# Patient Record
Sex: FEMALE | Race: OTHER | Employment: FULL TIME | URBAN - METROPOLITAN AREA
[De-identification: names, ages, dates, MRNs, and addresses within clinical notes are randomized per-mention and may not be internally consistent; named-entity substitution may affect disease eponyms.]

---

## 2018-03-06 ENCOUNTER — HOSPITAL ENCOUNTER (EMERGENCY)
Age: 23
Discharge: HOME | End: 2018-03-06
Attending: EMERGENCY MEDICINE
Payer: COMMERCIAL

## 2018-03-06 VITALS
OXYGEN SATURATION: 97 % | HEIGHT: 61 IN | RESPIRATION RATE: 20 BRPM | WEIGHT: 219 LBS | BODY MASS INDEX: 41.35 KG/M2 | DIASTOLIC BLOOD PRESSURE: 87 MMHG | HEART RATE: 106 BPM | TEMPERATURE: 97.7 F | SYSTOLIC BLOOD PRESSURE: 113 MMHG

## 2018-03-06 DIAGNOSIS — B02.9 HERPES ZOSTER WITHOUT COMPLICATION: Primary | ICD-10-CM

## 2018-03-06 PROCEDURE — 99282 EMERGENCY DEPT VISIT SF MDM: CPT

## 2018-03-06 RX ORDER — ACYCLOVIR 800 MG/1
800 TABLET ORAL
Qty: 50 TAB | Refills: 0 | Status: SHIPPED | OUTPATIENT
Start: 2018-03-06 | End: 2018-03-16

## 2018-03-06 RX ORDER — OXYCODONE AND ACETAMINOPHEN 5; 325 MG/1; MG/1
1 TABLET ORAL
Qty: 20 TAB | Refills: 0 | Status: SHIPPED | OUTPATIENT
Start: 2018-03-06

## 2018-03-06 NOTE — LETTER
[facility] [place] 
[facility] 
[address] [address] 
[phone] Work/School Note Date: 3/6/2018 To Whom It May concern: 
 
Derrick Briggs was seen and treated today in the emergency room by the following provider(s): 
Attending Provider: Nataly Patiño MD 
Physician Assistant: Caesar Ribeiro PA-C. Derrick Briggs may return to work on 3/9/18. Sincerely, Evelyn Radford PA-C

## 2018-03-06 NOTE — ED PROVIDER NOTES
EMERGENCY DEPARTMENT HISTORY AND PHYSICAL EXAM    Date: 3/6/2018  Patient Name: Priya Wilhelm    History of Presenting Illness     Chief Complaint   Patient presents with    Rash         History Provided By: Patient    Chief Complaint: Burning rash with blisters  Duration: 1 Weeks  Timing:  Acute  Location: Right-sided face radiating down her neck and into her back  Quality: Burning  Severity: 8 out of 10  Modifying Factors: No alleviation with a steroid cream.   Associated Symptoms: denies any other associated signs or symptoms    Additional History (Context):   9:21 AM  Priya Wilhelm is a 21 y.o. female with no significant PMHx who presents to the emergency department C/O burning rash (8/10) with blisters to right side of face radiating down her neck and back onset 1 weeks ago. No associated sxs. Pt reports she was seen at [facility] on 2/27/18 and was dx'ed with acute torticollis. States she was seen at [facility] after care on 3/3 for same and was placed on a steroid cream as patient recently used a new hair dye, which has provided no alleviation. NKDA. Pt is currently on her menstrual cycle. Pt denies fever, chills, recent illness, and any other sxs or complaints. PCP: No primary care provider on file. Past History     Past Medical History:  No past medical history on file. Past Surgical History:  No past surgical history on file. Family History:  No family history on file. Social History:  Social History   Substance Use Topics    Smoking status: Not on file    Smokeless tobacco: Not on file    Alcohol use Not on file       Allergies:  No Known Allergies      Review of Systems   Review of Systems   Constitutional: Negative for chills and fever. HENT: Positive for facial swelling (right side of face). Negative for congestion. Eyes: Negative for pain, redness and visual disturbance. Musculoskeletal: Positive for neck pain (at area of rash). Skin: Positive for rash. Neurological: Negative for dizziness, weakness and headaches. Hematological: Negative for adenopathy. Physical Exam     Vitals:    03/06/18 0914   BP: 113/87   Pulse: (!) 106   Resp: 20   Temp: 97.7 °F (36.5 °C)   SpO2: 97%   Weight: 99.3 kg (219 lb)   Height: 5' 1\" (1.549 m)     Physical Exam   Constitutional: She is oriented to person, place, and time. She appears well-developed and well-nourished. No distress.  female in NAD. Alert. Tearful at times   HENT:   Head: Normocephalic and atraumatic. Right Ear: There is swelling (R helix with rash) and tenderness. Tympanic membrane is not perforated, not erythematous and not bulging. Left Ear: External ear normal. No swelling or tenderness. Tympanic membrane is not perforated, not erythematous and not bulging. Nose: Nose normal. No mucosal edema or rhinorrhea. Right sinus exhibits no maxillary sinus tenderness and no frontal sinus tenderness. Left sinus exhibits no maxillary sinus tenderness and no frontal sinus tenderness. Mouth/Throat: Uvula is midline, oropharynx is clear and moist and mucous membranes are normal. No oral lesions. No trismus in the jaw. No dental abscesses or uvula swelling. No oropharyngeal exudate, posterior oropharyngeal edema, posterior oropharyngeal erythema or tonsillar abscesses. Eyes: Conjunctivae and EOM are normal. Pupils are equal, round, and reactive to light. Right eye exhibits no discharge. Left eye exhibits no discharge. Neck: Normal range of motion. Cardiovascular: Normal rate, regular rhythm, normal heart sounds and intact distal pulses. Exam reveals no gallop and no friction rub. No murmur heard. Pulmonary/Chest: Effort normal and breath sounds normal. No accessory muscle usage. No tachypnea. No respiratory distress. She has no decreased breath sounds. She has no wheezes. She has no rhonchi. She has no rales. Musculoskeletal: Normal range of motion.    Neurological: She is alert and oriented to person, place, and time. Skin: Skin is warm. She is not diaphoretic. Psychiatric: She has a normal mood and affect. Judgment normal.   tearful   Nursing note and vitals reviewed. Diagnostic Study Results     Labs -   No results found for this or any previous visit (from the past 12 hour(s)). Radiologic Studies -   No orders to display     CT Results  (Last 48 hours)    None        CXR Results  (Last 48 hours)    None          Medications given in the ED-  Medications - No data to display      Medical Decision Making   I am the first provider for this patient. I reviewed the vital signs, available nursing notes, past medical history, past surgical history, family history and social history. Vital Signs-Reviewed the patient's vital signs. Pulse Oximetry Analysis - 97% on RA. Records Reviewed: Nursing Notes    Provider Notes (Medical Decision Making): zoster, dermatitis, tinea, eczema, viral exanthem. Procedures:  Procedures    ED Course:   9:21 AM Initial assessment performed. The patients presenting problems have been discussed, and they are in agreement with the care plan formulated and outlined with them. I have encouraged them to ask questions as they arise throughout their visit. Diagnosis and Disposition     C/W zoster along C2, C3, C4. No evidence of nasociliary involvement. Antivirals. Pain meds. Stop topical steroid. FU with PCP. Reasons to RTED discussed with pt. All questions answered. Pt feels comfortable going home at this time. Pt expressed understanding and she agrees with plan. DISCHARGE NOTE:  9:31 AM  Radha Maya  results have been reviewed with her. She has been counseled regarding her diagnosis, treatment, and plan. She verbally conveys understanding and agreement of the signs, symptoms, diagnosis, treatment and prognosis and additionally agrees to follow up as discussed.   She also agrees with the care-plan and conveys that all of her questions have been answered. I have also provided discharge instructions for her that include: educational information regarding their diagnosis and treatment, and list of reasons why they would want to return to the ED prior to their follow-up appointment, should her condition change. She has been provided with education for proper emergency department utilization. CLINICAL IMPRESSION:    1. Herpes zoster without complication        PLAN:  1. D/C Home  2. Current Discharge Medication List      START taking these medications    Details   acyclovir (ZOVIRAX) 800 mg tablet Take 1 Tab by mouth five (5) times daily for 10 days. Indications: herpes zoster  Qty: 50 Tab, Refills: 0    Associated Diagnoses: Herpes zoster without complication      oxyCODONE-acetaminophen (PERCOCET) 5-325 mg per tablet Take 1 Tab by mouth every four (4) hours as needed for Pain. Max Daily Amount: 6 Tabs. Qty: 20 Tab, Refills: 0    Associated Diagnoses: Herpes zoster without complication         STOP taking these medications       TRIAMCINOLONE, BULK, Comments:   Reason for Stopping:             3.   Follow-up Information     Follow up With Details Comments Contact [facility] Schedule an appointment as soon as possible for a visit in 3 days For primary care follow up. [address] [facility], [address] [facility] [address] [facility] [address]    [facility] EMERG[facility] DE[facility] Go to As needed, If symptoms worsen [address] [id number] [phone]        _______________________________    Attestations: This note is prepared by Oralia Diaz, acting as Scribe for Fritz Francisco PA-C. Fritz Francisco PA-C:  The scribe's documentation has been prepared under my direction and personally reviewed by me in its entirety.   I confirm that the note above accurately reflects all work, treatment, procedures, and medical decision making performed by me.  _______________________________

## 2018-03-06 NOTE — ED TRIAGE NOTES
C/o painful rash with blisters to right side of face that is spreading to back, face and around right ear since March 1. Pt seen at [facility] on Feb 27 dx'd with torticolis, seen by [facility] after care on feb 3 for rash.

## 2018-03-06 NOTE — DISCHARGE INSTRUCTIONS
Shingles: Care Instructions  Your Care Instructions    Shingles (herpes zoster) causes pain and a blistered rash. The rash can appear anywhere on the body but will be on only one side of the body, the left or right. It will be in a band, a strip, or a small area. The pain can be very severe. Shingles can also cause tingling or itching in the area of the rash. The blisters scab over after a few days and heal in 2 to 4 weeks. Medicines can help you feel better and may help prevent more serious problems caused by shingles. Shingles is caused by the same virus that causes chickenpox. When you have chickenpox, the virus gets into your nerve roots and stays there (becomes dormant) long after you get over the chickenpox. If the virus becomes active again, it can cause shingles. Follow-up care is a key part of your treatment and safety. Be sure to make and go to all appointments, and call your doctor if you are having problems. It's also a good idea to know your test results and keep a list of the medicines you take. How can you care for yourself at home? · Be safe with medicines. Take your medicines exactly as prescribed. Call your doctor if you think you are having a problem with your medicine. Antiviral medicine helps you get better faster. · Try not to scratch or pick at the blisters. They will crust over and fall off on their own if you leave them alone. · Put cool, wet cloths on the area to relieve pain and itching. You can also use calamine lotion. Try not to use so much lotion that it cakes and is hard to get off. · Put cornstarch or baking soda on the sores to help dry them out so they heal faster. · Do not use thick ointment, such as petroleum jelly, on the sores. This will keep them from drying and healing. · To help remove loose crusts, soak them in tap water. This can help decrease oozing, and dry and soothe the skin.   · Take an over-the-counter pain medicine, such as acetaminophen (Tylenol), ibuprofen (Advil, Motrin), or naproxen (Aleve). Read and follow all instructions on the label. · Avoid close contact with people until the blisters have healed. It is very important for you to avoid contact with anyone who has never had chickenpox or the chickenpox vaccine. Pregnant women, young babies, and anyone else who has a hard time fighting infection (such as someone with HIV, diabetes, or cancer) is especially at risk. When should you call for help? Call your doctor now or seek immediate medical care if:  ? · You have a new or higher fever. ? · You have a severe headache and a stiff neck. ? · You lose the ability to think clearly. ? · The rash spreads to your forehead, nose, eyes, or eyelids. ? · You have eye pain, or your vision gets worse. ? · You have new pain in your face, or you cannot move the muscles in your face. ? · Blisters spread to new parts of your body. ? Watch closely for changes in your health, and be sure to contact your doctor if:  ? · The rash has not healed after 2 to 4 weeks. ? · You still have pain after the rash has healed. Where can you learn more? Go to http://liss-simeon.info/. Rowena Buckner in the search box to learn more about \"Shingles: Care Instructions. \"  Current as of: March 3, 2017  Content Version: 11.4  © 8776-8964 [employer]. Care instructions adapted under license by [employer] (which disclaims liability or warranty for this information). If you have questions about a medical condition or this instruction, always ask your healthcare professional. [address] any warranty or liability for your use of this information.

## 2018-10-20 ENCOUNTER — HOSPITAL ENCOUNTER (EMERGENCY)
Age: 23
Discharge: HOME | End: 2018-10-20
Attending: EMERGENCY MEDICINE
Payer: OTHER MISCELLANEOUS

## 2018-10-20 VITALS
HEART RATE: 79 BPM | OXYGEN SATURATION: 100 % | BODY MASS INDEX: 40.02 KG/M2 | DIASTOLIC BLOOD PRESSURE: 69 MMHG | WEIGHT: 212 LBS | RESPIRATION RATE: 16 BRPM | HEIGHT: 61 IN | TEMPERATURE: 97.7 F | SYSTOLIC BLOOD PRESSURE: 117 MMHG

## 2018-10-20 DIAGNOSIS — Z23 NEED FOR TDAP VACCINATION: ICD-10-CM

## 2018-10-20 DIAGNOSIS — Z02.6 ENCOUNTER RELATED TO WORKER'S COMPENSATION CLAIM: ICD-10-CM

## 2018-10-20 DIAGNOSIS — S61.212A LACERATION OF RIGHT MIDDLE FINGER WITHOUT FOREIGN BODY WITHOUT DAMAGE TO NAIL, INITIAL ENCOUNTER: Primary | ICD-10-CM

## 2018-10-20 PROCEDURE — 75810000293 HC SIMP/SUPERF WND  RPR

## 2018-10-20 PROCEDURE — 74011250636 HC RX REV CODE- 250/636: Performed by: PHYSICIAN ASSISTANT

## 2018-10-20 PROCEDURE — 90471 IMMUNIZATION ADMIN: CPT

## 2018-10-20 PROCEDURE — 77030002916 HC SUT ETHLN J&J -A

## 2018-10-20 PROCEDURE — 74011000250 HC RX REV CODE- 250: Performed by: PHYSICIAN ASSISTANT

## 2018-10-20 PROCEDURE — 90715 TDAP VACCINE 7 YRS/> IM: CPT | Performed by: PHYSICIAN ASSISTANT

## 2018-10-20 PROCEDURE — 99283 EMERGENCY DEPT VISIT LOW MDM: CPT

## 2018-10-20 RX ORDER — BACITRACIN 500 [USP'U]/G
OINTMENT TOPICAL
Status: COMPLETED | OUTPATIENT
Start: 2018-10-20 | End: 2018-10-20

## 2018-10-20 RX ADMIN — TETANUS TOXOID, REDUCED DIPHTHERIA TOXOID AND ACELLULAR PERTUSSIS VACCINE, ADSORBED 0.5 ML: 5; 2.5; 8; 8; 2.5 SUSPENSION INTRAMUSCULAR at 02:01

## 2018-10-20 RX ADMIN — BACITRACIN: 500 OINTMENT TOPICAL at 02:00

## 2018-10-20 NOTE — ED NOTES
I have reviewed discharge instructions with the patient. The patient verbalized understanding. I have reviewed the provider's instructions with the patient, answering all questions to her satisfaction. Pt electronically signed d/c instructions pt removed all belongings and ambulated without distress or discomfort.

## 2018-10-20 NOTE — DISCHARGE INSTRUCTIONS
Cuts Closed With Stitches: Care Instructions  Your Care Instructions  A cut can happen anywhere on your body. The doctor used stitches to close the cut. Using stitches also helps the cut heal and reduces scarring. Sometimes pieces of tape called Steri-Strips are put over the stitches. If the cut went deep and through the skin, the doctor may have put in two layers of stitches. The deeper layer brings the deep part of the cut together. These stitches will dissolve and don't need to be removed. The stitches in the upper layer are the ones you see on the cut. You will probably have a bandage over the stitches. You will need to have the stitches removed, usually in 7 to 14 days. The doctor has checked you carefully, but problems can develop later. If you notice any problems or new symptoms, get medical treatment right away. Follow-up care is a key part of your treatment and safety. Be sure to make and go to all appointments, and call your doctor if you are having problems. It's also a good idea to know your test results and keep a list of the medicines you take. How can you care for yourself at home? · Keep the cut dry for the first 24 to 48 hours. After this, you can shower if your doctor okays it. Pat the cut dry. · Don't soak the cut, such as in a bathtub. Your doctor will tell you when it's safe to get the cut wet. · If your doctor told you how to care for your cut, follow your doctor's instructions. If you did not get instructions, follow this general advice:  ? After the first 24 to 48 hours, wash around the cut with clean water 2 times a day. Don't use hydrogen peroxide or alcohol, which can slow healing. ? You may cover the cut with a thin layer of petroleum jelly, such as Vaseline, and a nonstick bandage. ? Apply more petroleum jelly and replace the bandage as needed. · Prop up the sore area on a pillow anytime you sit or lie down during the next 3 days.  Try to keep it above the level of your heart. This will help reduce swelling. · Avoid any activity that could cause your cut to reopen. · Do not remove the stitches on your own. Your doctor will tell you when to come back to have the stitches removed. · Leave Steri-Strips on until they fall off. · Be safe with medicines. Read and follow all instructions on the label. ? If the doctor gave you a prescription medicine for pain, take it as prescribed. ? If you are not taking a prescription pain medicine, ask your doctor if you can take an over-the-counter medicine. When should you call for help? Call your doctor now or seek immediate medical care if:    · You have new pain, or your pain gets worse.     · The skin near the cut is cold or pale or changes color.     · You have tingling, weakness, or numbness near the cut.     · The cut starts to bleed, and blood soaks through the bandage. Oozing small amounts of blood is normal.     · You have trouble moving the area near the cut.     · You have symptoms of infection, such as:  ? Increased pain, swelling, warmth, or redness around the cut.  ? Red streaks leading from the cut.  ? Pus draining from the cut.  ? A fever.    Watch closely for changes in your health, and be sure to contact your doctor if:    · The cut reopens.     · You do not get better as expected. Where can you learn more? Go to http://liss-simeon.info/. Enter R217 in the search box to learn more about \"Cuts Closed With Stitches: Care Instructions. \"  Current as of: November 20, 2017  Content Version: 11.8  © 6077-7169 [employer]. Care instructions adapted under license by [employer] (which disclaims liability or warranty for this information). If you have questions about a medical condition or this instruction, always ask your healthcare professional. [address] any warranty or liability for your use of this information.

## 2018-10-20 NOTE — LETTER
[facility] [place] 
[facility] EMERGENCY DEPT 
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 Laceration of right middle finger without foreign body without damage to nail, initial encounter S61.212A 883.0 2. Need for Tdap vaccination Z23 V06.1 3. Encounter related to worker's compensation claim Z02.6 V70.3 Instructions:  Employee must return copy of this report to Personnel and/or Supervisor. Patient Identification - Company Protocol:   
 []  Checked & Followed [x]  Not Available PART I:  Check Treatment 
 []  X-ray   []  Lab  [x]  Discharge Instructions Given  []  Rx Given 
 []  Non-Prescription Meds  [x]  Sutures     []  EKG [x]  Other (Comment): Tdap Part II:  Disposition 
 [x]  May Return to Regular Work Without Restrictions []  May Return to Restricted Duty as Below Explanation of RESTRICTIONS (Comment):   
 
 []  May NOT Return to Work until cleared by Referral Specialist or Occupational Medicine MD 
 
Part III:  Follow-Up Follow-up Information Follow up With Specialties Details Why Contact Info [facility] OCCUPATIONAL MED Occupational Medicine Schedule an appointment as soon as possible for a visit For follow up with [address] [place] [address] [address] [place] [address] [address] 
[phone] [facility] EMERGENCY DEPT Emergency Medicine Go to  For suture removal in 5-7 days [address] [address] 
[phone] Part IV: Was Workers Comp Supervisor Notified  []   Yes  [x]   No 
 
[place] was seen in the Emergency Department on 10/20/2018 by the following provider(s): 
Attending Provider: Hardy Ghotra MD 
 Physician Assistant: Delfina Vale; ED Nurse: PLEASE CALL CASE FACILITATOR, OCCUPATIONAL MEDICINE  
[phone] TO SCHEDULE AN APPOINTMENT Referral Physicians: Katie Haider MD 
[address] [facility]. [address], [address] [address]   [address], [address] Phone:  [phone]; Fax:  [phone] [address] [address] [facility] [address] [place] [address]. [address]    [address], [address] 
[phone] [facility] [address] [facility], [address], [address] 
[phone]

## 2018-10-20 NOTE — ED TRIAGE NOTES
Pt presents to ED with laceration to RT middle finger. \"I sliced it with a tomato slicer at work\" at 8288. Pt holding tissue to site. Bleeding controled. Pt unsure if Tetanus is up to date.

## 2018-10-20 NOTE — ED PROVIDER NOTES
EMERGENCY DEPARTMENT HISTORY AND PHYSICAL EXAM 
 
Date: 10/20/2018 Patient Name: Petros Yee History of Presenting Illness Chief Complaint Patient presents with  Laceration History Provided By: Patient Chief Complaint: laceration Duration: 30 Minutes Timing:  Acute Location: right 3rd finger, distal aspect Associated Symptoms:  right 3rd finger pain Additional History (Context):  
1:20 AM 
Petros Yee is a 21 y.o. female who presents to the emergency department C/O right 3rd finger laceration onset ~30 minutes ago. Associated symptoms include right 3rd finger pain. Reports she was cleaning the tomato cutter at work and cut her right 3rd finger. Pt denies numbness, tingling, weakeness any other Sx or complaints. PCP: Razia, MD Jim 
 
Current Outpatient Medications Medication Sig Dispense Refill  oxyCODONE-acetaminophen (PERCOCET) 5-325 mg per tablet Take 1 Tab by mouth every four (4) hours as needed for Pain. Max Daily Amount: 6 Tabs. 20 Tab 0 Past History Past Medical History: 
History reviewed. No pertinent past medical history. Past Surgical History: 
Past Surgical History:  
Procedure Laterality Date  HX HEENT Family History: 
History reviewed. No pertinent family history. Social History: 
Social History Tobacco Use  Smoking status: Never Smoker  Smokeless tobacco: Never Used Substance Use Topics  Alcohol use: No  
  Frequency: Never  Drug use: No  
 
 
Allergies: 
No Known Allergies Review of Systems Review of Systems Musculoskeletal: Positive for arthralgias. Skin: Positive for wound. Neurological: Negative for dizziness, weakness and numbness. All other systems reviewed and are negative. Physical Exam  
 
Vitals:  
 10/20/18 0115 10/20/18 0222 BP: (!) 151/102 117/69 Pulse: 89 79 Resp: 14 16 Temp: 97.7 °F (36.5 °C) SpO2: 99% 100% Weight: 96.2 kg (212 lb) Height: 5' 1\" (1.549 m) Physical Exam  
Constitutional: She is oriented to person, place, and time. She appears well-developed and well-nourished. No distress.  female in NAD. Alert. Appears comfortable. HENT:  
Head: Normocephalic and atraumatic. Right Ear: External ear normal.  
Left Ear: External ear normal.  
Nose: Nose normal.  
Eyes: Conjunctivae are normal.  
Neck: Normal range of motion. Cardiovascular: Normal rate, regular rhythm, normal heart sounds and intact distal pulses. Exam reveals no gallop and no friction rub. No murmur heard. Pulses: 
     Radial pulses are 2+ on the right side, and 2+ on the left side. Pulmonary/Chest: Effort normal and breath sounds normal. No accessory muscle usage. No tachypnea. She has no decreased breath sounds. She has no rhonchi. Musculoskeletal: Normal range of motion. Right hand: She exhibits laceration. She exhibits normal range of motion, no tenderness, normal capillary refill and no swelling. Normal sensation noted. Normal strength noted. Hands: 
Neurological: She is alert and oriented to person, place, and time. Skin: Skin is warm and dry. She is not diaphoretic. Psychiatric: She has a normal mood and affect. Judgment normal.  
Nursing note and vitals reviewed. Diagnostic Study Results Labs - No results found for this or any previous visit (from the past 12 hour(s)). Radiologic Studies - No orders to display CT Results  (Last 48 hours) None CXR Results  (Last 48 hours) None Medications given in the ED- Medications diph,Pertuss(AC),Tet Vac-PF (BOOSTRIX) suspension 0.5 mL (0.5 mL IntraMUSCular Given 10/20/18 0201) bacitracin 500 unit/gram ointment ( Topical Given 10/20/18 0200) Medical Decision Making I am the first provider for this patient.  
 
I reviewed the vital signs, available nursing notes, past medical history, past surgical history, family history and social history. Vital Signs-Reviewed the patient's vital signs. Pulse Oximetry Analysis - 99% on RA Records Reviewed: Nursing Notes and Old Medical Records Provider Notes (Medical Decision Making): laceration to finger. No tendon involvement. NVI. Will perform suture repair. Procedures:  
Wound Repair 
Date/Time: 10/20/2018 1:24 AM 
Performed by: [id number] [facility] provider: Dr. Portillo Doing Preparation: sterile field established and skin prepped with Shur-Clens Time out: Immediately prior to the procedure a time out was called to verify the correct patient, procedure, equipment, staff and marking as appropriate. Ezra Counter Location details: right long finger Wound length:2.5 cm or less Anesthesia: local infiltration Anesthesia: 
Local Anesthetic: lidocaine 1% without epinephrine Anesthetic total: 1 mL Foreign bodies: no foreign bodies Irrigation solution: saline Irrigation method: syringe Skin closure: 5-0 nylon Number of sutures: 3 Technique: simple Approximation: close Dressing: 4x4 and antibiotic ointment Patient tolerance: Patient tolerated the procedure well with no immediate complications My total time at bedside, performing this procedure was 1-15 minutes. ED Course:  
1:20 AM Initial assessment performed. The patients presenting problems have been discussed, and they are in agreement with the care plan formulated and outlined with them. I have encouraged them to ask questions as they arise throughout their visit. Diagnosis and Disposition Successful suture repair. NVI. Removal in 5-7 days. Reviewed proper wound care. Tdap updated. Reasons to RTED discussed with pt. All questions answered. Pt feels comfortable going home at this time. Pt expressed understanding and she agrees with plan. DISCHARGE NOTE: 
1:45 AM 
Liset Jackson's  results have been reviewed with her.   She has been counseled regarding her diagnosis, treatment, and plan. She verbally conveys understanding and agreement of the signs, symptoms, diagnosis, treatment and prognosis and additionally agrees to follow up as discussed. She also agrees with the care-plan and conveys that all of her questions have been answered. I have also provided discharge instructions for her that include: educational information regarding their diagnosis and treatment, and list of reasons why they would want to return to the ED prior to their follow-up appointment, should her condition change. She has been provided with education for proper emergency department utilization. CLINICAL IMPRESSION: 
 
1. Laceration of right middle finger without foreign body without damage to nail, initial encounter 2. Need for Tdap vaccination 3. Encounter related to worker's compensation claim PLAN: 
1. D/C Home 2. Discharge Medication List as of 10/20/2018  1:45 AM  
  
 
3. Follow-up Information Follow up With Specialties Details Why Contact Info [facility] OCCUPATIONAL MED Occupational Medicine Schedule an appointment as soon as possible for a visit For follow up with [address] [place] [address] [address] [place] [id number] 
[phone] [facility] EMERGENCY DEPT Emergency Medicine Go to  For suture removal in 5-7 days [address] [place] [id number] 
[phone]  
  
 
_______________________________ Attestations: This note is prepared by Gina Dominguez, acting as Scribe for [employer]. [employer]:  The scribe's documentation has been prepared under my direction and personally reviewed by me in its entirety. I confirm that the note above accurately reflects all work, treatment, procedures, and medical decision making performed by me. 
_______________________________

## 2018-10-28 ENCOUNTER — HOSPITAL ENCOUNTER (EMERGENCY)
Age: 23
Discharge: HOME | End: 2018-10-28
Attending: EMERGENCY MEDICINE
Payer: COMMERCIAL

## 2018-10-28 VITALS
OXYGEN SATURATION: 98 % | WEIGHT: 215 LBS | BODY MASS INDEX: 40.59 KG/M2 | RESPIRATION RATE: 16 BRPM | HEIGHT: 61 IN | HEART RATE: 69 BPM | DIASTOLIC BLOOD PRESSURE: 73 MMHG | TEMPERATURE: 97.7 F | SYSTOLIC BLOOD PRESSURE: 123 MMHG

## 2018-10-28 DIAGNOSIS — Z48.02 VISIT FOR SUTURE REMOVAL: Primary | ICD-10-CM

## 2018-10-28 PROCEDURE — 75810000275 HC EMERGENCY DEPT VISIT NO LEVEL OF CARE

## 2018-10-28 PROCEDURE — 77030008027

## 2018-10-28 NOTE — DISCHARGE INSTRUCTIONS

## 2018-10-28 NOTE — ED PROVIDER NOTES
EMERGENCY DEPARTMENT HISTORY AND PHYSICAL EXAM    Date: 10/28/2018  Patient Name: Ephraim Youngblood    History of Presenting Illness     Chief Complaint   Patient presents with    Suture Removal         History Provided By: Patient    Chief Complaint: Suture removal  Duration: 8 Days  Timing:  Acute  Location: Right third finger  Modifying Factors: No relieving or worsening factors  Associated Symptoms: denies any other associated signs or symptoms    Additional History (Context):   1:06 PM  Ephraim Youngblood is a 21 y.o. female who presents to the emergency department due to suture removal for 3 intact sutures placed on the distal aspect right third finger at this facility 7 days ago. Pt denies any other sxs or complaints. PCP: Jim Randle MD    Current Outpatient Medications   Medication Sig Dispense Refill    oxyCODONE-acetaminophen (PERCOCET) 5-325 mg per tablet Take 1 Tab by mouth every four (4) hours as needed for Pain. Max Daily Amount: 6 Tabs. 20 Tab 0       Past History     Past Medical History:  History reviewed. No pertinent past medical history. Past Surgical History:  Past Surgical History:   Procedure Laterality Date    HX HEENT         Family History:  History reviewed. No pertinent family history. Social History:  Social History     Tobacco Use    Smoking status: Never Smoker    Smokeless tobacco: Never Used   Substance Use Topics    Alcohol use: No     Frequency: Never    Drug use: No       Allergies:  No Known Allergies      Review of Systems   Review of Systems   Constitutional: Negative for fever. Skin: Positive for wound (right third finger, healing laceration). All other systems reviewed and are negative. Physical Exam     Vitals:    10/28/18 1247   BP: 123/73   Pulse: 69   Resp: 16   Temp: 97.7 °F (36.5 °C)   SpO2: 98%   Weight: 97.5 kg (215 lb)   Height: 5' 1\" (1.549 m)     Physical Exam   Constitutional: She is oriented to person, place, and time.  She appears well-developed and well-nourished. HENT:   Head: Normocephalic. Eyes: Conjunctivae are normal.   Neck: Normal range of motion. Pulmonary/Chest: Effort normal.   Musculoskeletal: Normal range of motion. Hands:  Neurological: She is alert and oriented to person, place, and time. Skin: Skin is warm and dry. No erythema. Diagnostic Study Results     Labs -   No results found for this or any previous visit (from the past 12 hour(s)). Radiologic Studies -   No orders to display     CT Results  (Last 48 hours)    None        CXR Results  (Last 48 hours)    None          Medications given in the ED-  Medications - No data to display      Medical Decision Making   I am the first provider for this patient. I reviewed the vital signs, available nursing notes, past medical history, past surgical history, family history and social history. Vital Signs-Reviewed the patient's vital signs. Pulse Oximetry Analysis - 98% on room air       Records Reviewed: Nursing Notes and Old Medical Records    Provider Notes (Medical Decision Making): Sutures removed. Good wound healing. Patient understands reasons to return and will follow up with [facility]. Procedures:  Suture/Staple Removal  Date/Time: 10/28/2018 1:07 PM  Performed by: Harrison Gutierrez NP  Authorized by: Stormy Valencia MD     Consent:     Consent obtained:  Verbal    Consent given by:  Patient    Risks discussed:  Bleeding, pain and wound separation    Alternatives discussed:  Delayed treatment  Location:     Location:  Upper extremity    Upper extremity location:  Hand    Hand location:  R long finger (lateral aspect)  Procedure details:     Wound appearance:  Good wound healing, no signs of infection, clean and tender (mild tenderness, no erythema)    Number of sutures removed:  3  Post-procedure details:     Post-removal:  No dressing applied    Patient tolerance of procedure:   Tolerated well, no immediate complications          ED Course:   1:06 PM Initial assessment performed. The patients presenting problems have been discussed, and they are in agreement with the care plan formulated and outlined with them. I have encouraged them to ask questions as they arise throughout their visit. Diagnosis and Disposition       DISCHARGE NOTE:  1:18 PM  Sri Ashford  results have been reviewed with her. She has been counseled regarding her diagnosis, treatment, and plan. She verbally conveys understanding and agreement of the signs, symptoms, diagnosis, treatment and prognosis and additionally agrees to follow up as discussed. She also agrees with the care-plan and conveys that all of her questions have been answered. I have also provided discharge instructions for her that include: educational information regarding their diagnosis and treatment, and list of reasons why they would want to return to the ED prior to their follow-up appointment, should her condition change. She has been provided with education for proper emergency department utilization. CLINICAL IMPRESSION:    1. Visit for suture removal        PLAN:  1. D/C Home  2. Discharge Medication List as of 10/28/2018  1:13 PM        3. Follow-up Information     Follow up With Specialties Details Why Contact Info    [address] Occupational Medicine Schedule an appointment as soon as possible for a visit in 2 days For primary care follow up [address]  [address] [facility] [address]    [facility] EMERGENCY DEPT Emergency Medicine Go to As needed, if symptoms worsen [address]  [address] [facility] [address] [phone]        _______________________________    Attestations: This note is prepared by Chery Parada, acting as Scribe for Kassandra Granados NP. Kassandra Granados NP:  The scribe's documentation has been prepared under my direction and personally reviewed by me in its entirety.   I confirm that the note above accurately reflects all work, treatment, procedures, and medical decision making performed by me.  _______________________________